# Patient Record
Sex: MALE | URBAN - METROPOLITAN AREA
[De-identification: names, ages, dates, MRNs, and addresses within clinical notes are randomized per-mention and may not be internally consistent; named-entity substitution may affect disease eponyms.]

---

## 2017-09-05 ENCOUNTER — EMERGENCY (EMERGENCY)
Facility: HOSPITAL | Age: 42
LOS: 1 days | Discharge: HOME | End: 2017-09-05
Admitting: EMERGENCY MEDICINE
Payer: SELF-PAY

## 2017-09-05 VITALS
RESPIRATION RATE: 18 BRPM | OXYGEN SATURATION: 99 % | SYSTOLIC BLOOD PRESSURE: 140 MMHG | HEART RATE: 86 BPM | TEMPERATURE: 98 F | DIASTOLIC BLOOD PRESSURE: 102 MMHG

## 2017-09-05 DIAGNOSIS — R20.0 ANESTHESIA OF SKIN: ICD-10-CM

## 2017-09-05 DIAGNOSIS — M54.42 LUMBAGO WITH SCIATICA, LEFT SIDE: ICD-10-CM

## 2017-09-05 DIAGNOSIS — Z88.0 ALLERGY STATUS TO PENICILLIN: ICD-10-CM

## 2017-09-05 DIAGNOSIS — M54.5 LOW BACK PAIN: ICD-10-CM

## 2017-09-05 PROCEDURE — 99284 EMERGENCY DEPT VISIT MOD MDM: CPT

## 2017-09-05 RX ORDER — IBUPROFEN 200 MG
600 TABLET ORAL ONCE
Qty: 0 | Refills: 0 | Status: COMPLETED | OUTPATIENT
Start: 2017-09-05 | End: 2017-09-05

## 2017-09-05 RX ORDER — OXYCODONE AND ACETAMINOPHEN 5; 325 MG/1; MG/1
1 TABLET ORAL ONCE
Qty: 0 | Refills: 0 | Status: DISCONTINUED | OUTPATIENT
Start: 2017-09-05 | End: 2017-09-05

## 2017-09-05 RX ORDER — CYCLOBENZAPRINE HYDROCHLORIDE 10 MG/1
1 TABLET, FILM COATED ORAL
Qty: 15 | Refills: 0 | OUTPATIENT
Start: 2017-09-05 | End: 2017-09-10

## 2017-09-05 RX ADMIN — OXYCODONE AND ACETAMINOPHEN 1 TABLET(S): 5; 325 TABLET ORAL at 16:47

## 2017-09-05 RX ADMIN — Medication 600 MILLIGRAM(S): at 15:59

## 2017-09-05 NOTE — ED PROVIDER NOTE - MUSCULOSKELETAL, MLM
Spine appears normal. Mild L sided paralumbar tenderness. Spine appears normal. Mild L sided paralumbar tenderness. no midline tenderness, strength normal ,negative SLR bilaterally

## 2017-09-05 NOTE — ED PROVIDER NOTE - OBJECTIVE STATEMENT
40 y/o M with chronic back pain and sciatica p/w L lower back pain exacerbated by mechanical trip and fall last week. Pt reports he has pain and numbness radiating down L leg which is similar to previous episodes of sciatica. No relief with Ibuprofen. No f/c, incontinence, weakness, deformity, or difficulty bearing weight.

## 2017-09-05 NOTE — ED PROVIDER NOTE - MEDICAL DECISION MAKING DETAILS
41 year old male presents with Left sided sciatica pain. no direct trauma. no midline tenderness. no indication for imaging at this time. no red flags. pain improved in ED. stable for discharge

## 2024-08-06 ENCOUNTER — OFFICE (OUTPATIENT)
Dept: URBAN - METROPOLITAN AREA CLINIC 76 | Facility: CLINIC | Age: 49
Setting detail: OPHTHALMOLOGY
End: 2024-08-06

## 2024-08-06 DIAGNOSIS — Z01.00: ICD-10-CM

## 2024-08-06 PROCEDURE — MMC CONSUL MMC CONSULT: Performed by: OPHTHALMOLOGY

## 2024-08-09 ENCOUNTER — OFFICE (OUTPATIENT)
Dept: URBAN - METROPOLITAN AREA CLINIC 76 | Facility: CLINIC | Age: 49
Setting detail: OPHTHALMOLOGY
End: 2024-08-09
Payer: COMMERCIAL

## 2024-08-09 DIAGNOSIS — H35.033: ICD-10-CM
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
## 2024-08-09 PROCEDURE — MMC CONSUL MMC CONSULT: Performed by: OPHTHALMOLOGY

## 2024-08-09 ASSESSMENT — CONFRONTATIONAL VISUAL FIELD TEST (CVF)
OS_FINDINGS: FULL
OD_FINDINGS: FULL